# Patient Record
Sex: MALE | Race: BLACK OR AFRICAN AMERICAN | NOT HISPANIC OR LATINO
[De-identification: names, ages, dates, MRNs, and addresses within clinical notes are randomized per-mention and may not be internally consistent; named-entity substitution may affect disease eponyms.]

---

## 2018-04-20 PROBLEM — Z00.00 ENCOUNTER FOR PREVENTIVE HEALTH EXAMINATION: Status: ACTIVE | Noted: 2018-04-20

## 2018-04-23 ENCOUNTER — APPOINTMENT (OUTPATIENT)
Dept: SURGERY | Facility: CLINIC | Age: 58
End: 2018-04-23
Payer: COMMERCIAL

## 2018-04-23 VITALS
WEIGHT: 265 LBS | OXYGEN SATURATION: 94 % | DIASTOLIC BLOOD PRESSURE: 109 MMHG | HEIGHT: 71 IN | SYSTOLIC BLOOD PRESSURE: 165 MMHG | HEART RATE: 97 BPM | TEMPERATURE: 98 F | BODY MASS INDEX: 37.1 KG/M2

## 2018-04-23 DIAGNOSIS — Z78.9 OTHER SPECIFIED HEALTH STATUS: ICD-10-CM

## 2018-04-23 DIAGNOSIS — Z87.09 PERSONAL HISTORY OF OTHER DISEASES OF THE RESPIRATORY SYSTEM: ICD-10-CM

## 2018-04-23 DIAGNOSIS — F17.200 NICOTINE DEPENDENCE, UNSPECIFIED, UNCOMPLICATED: ICD-10-CM

## 2018-04-23 DIAGNOSIS — Z86.79 PERSONAL HISTORY OF OTHER DISEASES OF THE CIRCULATORY SYSTEM: ICD-10-CM

## 2018-04-23 DIAGNOSIS — R22.1 LOCALIZED SWELLING, MASS AND LUMP, NECK: ICD-10-CM

## 2018-04-23 LAB
BASOPHILS # BLD AUTO: 0.04 K/UL
BASOPHILS NFR BLD AUTO: 0.6 %
EOSINOPHIL # BLD AUTO: 0.22 K/UL
EOSINOPHIL NFR BLD AUTO: 3.4 %
HCT VFR BLD CALC: 47.9 %
HGB BLD-MCNC: 15.6 G/DL
IMM GRANULOCYTES NFR BLD AUTO: 0.2 %
LYMPHOCYTES # BLD AUTO: 2.17 K/UL
LYMPHOCYTES NFR BLD AUTO: 34 %
MAN DIFF?: NORMAL
MCHC RBC-ENTMCNC: 28.8 PG
MCHC RBC-ENTMCNC: 32.6 GM/DL
MCV RBC AUTO: 88.5 FL
MONOCYTES # BLD AUTO: 0.52 K/UL
MONOCYTES NFR BLD AUTO: 8.1 %
NEUTROPHILS # BLD AUTO: 3.43 K/UL
NEUTROPHILS NFR BLD AUTO: 53.7 %
PLATELET # BLD AUTO: 249 K/UL
RBC # BLD: 5.41 M/UL
RBC # FLD: 13.4 %
WBC # FLD AUTO: 6.39 K/UL

## 2018-04-23 PROCEDURE — 99202 OFFICE O/P NEW SF 15 MIN: CPT

## 2018-04-24 LAB
ALBUMIN SERPL ELPH-MCNC: 4.3 G/DL
ALP BLD-CCNC: 71 U/L
ALT SERPL-CCNC: 19 U/L
ANION GAP SERPL CALC-SCNC: 14 MMOL/L
AST SERPL-CCNC: 19 U/L
BILIRUB SERPL-MCNC: 0.5 MG/DL
BUN SERPL-MCNC: 15 MG/DL
CALCIUM SERPL-MCNC: 9.3 MG/DL
CHLORIDE SERPL-SCNC: 99 MMOL/L
CO2 SERPL-SCNC: 29 MMOL/L
CREAT SERPL-MCNC: 0.9 MG/DL
GLUCOSE SERPL-MCNC: 91 MG/DL
POTASSIUM SERPL-SCNC: 4.5 MMOL/L
PROT SERPL-MCNC: 7.8 G/DL
SODIUM SERPL-SCNC: 142 MMOL/L

## 2018-05-04 ENCOUNTER — APPOINTMENT (OUTPATIENT)
Dept: SURGERY | Facility: HOSPITAL | Age: 58
End: 2018-05-04

## 2018-07-13 ENCOUNTER — APPOINTMENT (OUTPATIENT)
Dept: SURGERY | Facility: HOSPITAL | Age: 58
End: 2018-07-13

## 2018-08-13 ENCOUNTER — OUTPATIENT (OUTPATIENT)
Dept: OUTPATIENT SERVICES | Facility: HOSPITAL | Age: 58
LOS: 1 days | End: 2018-08-13
Payer: COMMERCIAL

## 2018-08-13 VITALS
OXYGEN SATURATION: 97 % | SYSTOLIC BLOOD PRESSURE: 135 MMHG | TEMPERATURE: 98 F | RESPIRATION RATE: 16 BRPM | DIASTOLIC BLOOD PRESSURE: 80 MMHG | HEART RATE: 92 BPM | HEIGHT: 71 IN | WEIGHT: 294.98 LBS

## 2018-08-13 DIAGNOSIS — G47.33 OBSTRUCTIVE SLEEP APNEA (ADULT) (PEDIATRIC): ICD-10-CM

## 2018-08-13 DIAGNOSIS — R22.1 LOCALIZED SWELLING, MASS AND LUMP, NECK: ICD-10-CM

## 2018-08-13 DIAGNOSIS — Z01.818 ENCOUNTER FOR OTHER PREPROCEDURAL EXAMINATION: ICD-10-CM

## 2018-08-13 DIAGNOSIS — Z98.890 OTHER SPECIFIED POSTPROCEDURAL STATES: Chronic | ICD-10-CM

## 2018-08-13 PROCEDURE — G0463: CPT

## 2018-08-13 RX ORDER — SODIUM CHLORIDE 9 MG/ML
3 INJECTION INTRAMUSCULAR; INTRAVENOUS; SUBCUTANEOUS EVERY 8 HOURS
Qty: 0 | Refills: 0 | Status: DISCONTINUED | OUTPATIENT
Start: 2018-08-15 | End: 2018-08-23

## 2018-08-13 RX ORDER — ALBUTEROL 90 UG/1
2 AEROSOL, METERED ORAL
Qty: 0 | Refills: 0 | COMMUNITY

## 2018-08-13 NOTE — H&P PST ADULT - NEGATIVE ENMT SYMPTOMS
no vertigo/no nasal obstruction/no post-nasal discharge/no abnormal taste sensation/no gum bleeding/no tinnitus/no sinus symptoms/no nose bleeds/no dry mouth/no recurrent cold sores/no throat pain/no dysphagia/no ear pain/no nasal congestion/no nasal discharge/no hearing difficulty

## 2018-08-13 NOTE — H&P PST ADULT - NEGATIVE GENERAL GENITOURINARY SYMPTOMS
no renal colic/no hematuria/no flank pain L/no gas in urine/normal urinary frequency/no dysuria/no incontinence/no urinary hesitancy/no flank pain R/no urine discoloration

## 2018-08-13 NOTE — H&P PST ADULT - NEGATIVE CARDIOVASCULAR SYMPTOMS
no paroxysmal nocturnal dyspnea/no palpitations/no dyspnea on exertion/no orthopnea/no claudication/no peripheral edema/no chest pain

## 2018-08-13 NOTE — H&P PST ADULT - RS GEN PE MLT RESP DETAILS PC
no wheezes/good air movement/airway patent/breath sounds equal/clear to auscultation bilaterally/no rhonchi/no rales/respirations non-labored

## 2018-08-13 NOTE — H&P PST ADULT - PROBLEM SELECTOR PLAN 2
BMI 41.1- HIGH RISK FOR- STOP BANG SCORE 5 for male gender, age, HTN, size of neck and BMI and airway 2 on phonation - possible difficult intubation, hx of COPD, called DR. Baker Surgeon and notified and as per Surgeon sx can be done under local, called DR. Rubalcava Anesthesia , and made aware of all situation

## 2018-08-13 NOTE — H&P PST ADULT - NSANTHOSAYNRD_GEN_A_CORE
No. AZRA screening performed.  STOP BANG Legend: 0-2 = LOW Risk; 3-4 = INTERMEDIATE Risk; 5-8 = HIGH Risk/high risk for AZRA

## 2018-08-13 NOTE — H&P PST ADULT - MALLAMPATI CLASS
airway II on phonation - possible difficult intubation-/Class II - visualization of the soft palate, fauces, and uvula

## 2018-08-13 NOTE — H&P PST ADULT - GASTROINTESTINAL DETAILS
bowel sounds normal/normal/soft/no masses palpable/no rebound tenderness/no guarding/no distention/nontender
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
## 2018-08-13 NOTE — H&P PST ADULT - HISTORY OF PRESENT ILLNESS
58 years old male presented with right side of neck mass x few years, pt had I and D done on ER 5 years ago and mass grew again, no pain noted,. Diagnosed with mass on neck and is scheduled for excision of right neck mass on 8/15/18.

## 2018-08-13 NOTE — H&P PST ADULT - NECK DETAILS
large neck, right side of neck 2 cm round mass, mobile , soft, no tender/supple/no JVD/normal thyroid gland

## 2018-08-14 ENCOUNTER — TRANSCRIPTION ENCOUNTER (OUTPATIENT)
Age: 58
End: 2018-08-14

## 2018-08-15 ENCOUNTER — OUTPATIENT (OUTPATIENT)
Dept: OUTPATIENT SERVICES | Facility: HOSPITAL | Age: 58
LOS: 1 days | End: 2018-08-15
Payer: COMMERCIAL

## 2018-08-15 ENCOUNTER — RESULT REVIEW (OUTPATIENT)
Age: 58
End: 2018-08-15

## 2018-08-15 ENCOUNTER — APPOINTMENT (OUTPATIENT)
Dept: SURGERY | Facility: HOSPITAL | Age: 58
End: 2018-08-15

## 2018-08-15 VITALS
RESPIRATION RATE: 18 BRPM | HEART RATE: 87 BPM | SYSTOLIC BLOOD PRESSURE: 116 MMHG | TEMPERATURE: 98 F | DIASTOLIC BLOOD PRESSURE: 74 MMHG | OXYGEN SATURATION: 95 %

## 2018-08-15 VITALS
RESPIRATION RATE: 17 BRPM | TEMPERATURE: 98 F | HEART RATE: 98 BPM | SYSTOLIC BLOOD PRESSURE: 149 MMHG | OXYGEN SATURATION: 96 % | DIASTOLIC BLOOD PRESSURE: 88 MMHG | WEIGHT: 294.98 LBS | HEIGHT: 71 IN

## 2018-08-15 DIAGNOSIS — R22.1 LOCALIZED SWELLING, MASS AND LUMP, NECK: ICD-10-CM

## 2018-08-15 DIAGNOSIS — Z01.818 ENCOUNTER FOR OTHER PREPROCEDURAL EXAMINATION: ICD-10-CM

## 2018-08-15 DIAGNOSIS — Z98.890 OTHER SPECIFIED POSTPROCEDURAL STATES: Chronic | ICD-10-CM

## 2018-08-15 PROCEDURE — ZZZZZ: CPT

## 2018-08-15 PROCEDURE — 11424 EXC H-F-NK-SP B9+MARG 3.1-4: CPT

## 2018-08-15 PROCEDURE — 88304 TISSUE EXAM BY PATHOLOGIST: CPT

## 2018-08-15 PROCEDURE — 88304 TISSUE EXAM BY PATHOLOGIST: CPT | Mod: 26

## 2018-08-15 PROCEDURE — 12032 INTMD RPR S/A/T/EXT 2.6-7.5: CPT

## 2018-08-15 PROCEDURE — 12042 INTMD RPR N-HF/GENIT2.6-7.5: CPT

## 2018-08-15 RX ORDER — SODIUM CHLORIDE 9 MG/ML
1000 INJECTION, SOLUTION INTRAVENOUS
Qty: 0 | Refills: 0 | Status: DISCONTINUED | OUTPATIENT
Start: 2018-08-15 | End: 2018-08-15

## 2018-08-15 RX ORDER — HYDROMORPHONE HYDROCHLORIDE 2 MG/ML
0.25 INJECTION INTRAMUSCULAR; INTRAVENOUS; SUBCUTANEOUS
Qty: 0 | Refills: 0 | Status: DISCONTINUED | OUTPATIENT
Start: 2018-08-15 | End: 2018-08-15

## 2018-08-15 RX ORDER — IBUPROFEN 200 MG
600 TABLET ORAL EVERY 6 HOURS
Qty: 0 | Refills: 0 | Status: DISCONTINUED | OUTPATIENT
Start: 2018-08-15 | End: 2018-08-23

## 2018-08-15 NOTE — BRIEF OPERATIVE NOTE - PROCEDURE
<<-----Click on this checkbox to enter Procedure Excision of mass of neck  08/15/2018    Active  RESHMA

## 2018-08-20 LAB — SURGICAL PATHOLOGY STUDY: SIGNIFICANT CHANGE UP

## 2021-07-08 NOTE — H&P PST ADULT - NS MD HP INPLANTS MED DEV
None Purse String (Simple) Text: Given the location of the defect and the characteristics of the surrounding skin a purse string simple closure was deemed most appropriate.  Undermining was performed circumferentially around the surgical defect.  A purse string suture was then placed and tightened.

## 2025-02-04 NOTE — H&P PST ADULT - DOES THIS PATIENT HAVE A HISTORY OF OR HAS BEEN DX WITH HEART FAILURE?
Chief Complaint   Patient presents with    Follow-up    LVH    LVOT obstruction       HPI  71 yo WF w/ h/o LVOT ob, LVH, ?AS, cor calc on CT, athero of Ao now here for cardiology FU. Over past few months, having R>L LE edema. No chest pain. No dyspnea at rest. +occ LANE nilo w/ current URI. No palps. No LH/dizziness/syncope. No claudication. No cough outside of current URI.  She exercises at gym 5d/wk (cardio, wgts) with no symptoms.  BP at home: 120s-130s/80s  ECG 9/17: SR (89), LVH w/ ST changes  ECG 11/22: SR (81), LBBB, ?LAE  ECG 2/25: SR (78), PVC, LBBB  Echo 9/17: EF 55-60%, DD, mod-sev conc LVH, LVOT obstruction (peak grad 151), mod LAE, mod AS [looks at most mild to me with JORGE 2.36 by VTI]  Echo 12/22: EF 55-60%, pseudonl/DD, sev LVH, sev LVOT obst (rest 76, Valsalva 130), ?AS  CXR 11/22: no acute abnl, CM  CXR 1/25: reportedly normal  CTA chest 11/22: no PE, sm-mod infiltrate LLL, sm infiltrate RML, sm L pleur eff, cor calc, normal heart size, no peric eff, athero of Ao     Review of Systems   Constitutional: Negative for chills, fever and malaise/fatigue.   HENT:  Negative for hearing loss.    Eyes:  Negative for visual disturbance.   Respiratory:  Negative for cough, hemoptysis and wheezing.    Skin:  Negative for rash.   Musculoskeletal:  Negative for falls and myalgias.   Gastrointestinal:  Negative for bloating, abdominal pain, constipation, diarrhea, dysphagia, nausea and vomiting.   Genitourinary:  Negative for dysuria and hematuria.   Neurological:  Negative for headaches.   Psychiatric/Behavioral:  Negative for altered mental status, depression and memory loss.       Social History     Tobacco Use    Smoking status: Never    Smokeless tobacco: Never   Substance Use Topics    Alcohol use: Never      Family History   Problem Relation Name Age of Onset    Aplastic anemia Mother      Depression Mother      Diabetes Brother        Allergies   Allergen Reactions    Pneumococcal Vaccine Fever     Fever to  "102    Albuterol Palpitations     Increased heart rate and nervousness.    Azithromycin Other     Cardiac arrhthymias    Ciprofloxacin Unknown    Levofloxacin Unknown    Nsaids (Non-Steroidal Anti-Inflammatory Drug) GI Upset and Unknown      Current Outpatient Medications   Medication Instructions    amoxicillin (Amoxil) 500 mg capsule 1 capsule, 3 times daily (0900,1400,1900)    ascorbic acid (Vitamin C) 500 mg tablet 1 tablet, Daily    Bacillus coagulans (PROBIOTIC, B. COAGULANS, ORAL) Take by mouth.    biotin 5,000 mcg tablet,disintegrating Take by mouth.    cholecalciferol (Vitamin D3) 25 MCG (1000 UT) capsule 1 capsule, Daily    collagen, hydrolysate, bovine, (COLLAGEN, HYDR, BOVINE,, BULK, MISC) 1 Scoop, Daily RT    dietary supplement capsule Vision Complex: Lutein and Zeaxanthin. One tab daily by mouth    DOCOSAHEXAENOIC ACID ORAL 2,000 mg, Daily RT    FRUCTOOLIGOSACCHARIDES ORAL 1 capsule, Daily    gelatin, bulk, powder 1 Scoop, Daily RT    glucosam-msm-chond-hyaluron ac 245--1 mg tablet Take by mouth.    glucosamine-chondroitin 500-400 mg tablet 1 tablet, 3 times daily    lactobacillus acidophilus (Lactobacillus acidoph-L.bulgar) tablet tablet Take by mouth.    levomefolate calcium (L-Methylfolate) 15 mg tablet Daily    LUTEIN-ZEAXANTHIN ORAL 1 tablet, Daily    MAGNESIUM CITRATE ORAL Take by mouth.    multivitamin with minerals (Adults Multivitamin) tablet 1 tablet, Daily    omega 3-dha-epa-fish oil (Fish OiL) 1,000 mg (120 mg-180 mg) capsule 1 capsule, Daily    plant stanol shelley (Cholest Off Plus) 450 mg capsule Daily    RESVERATROL ORAL Daily    VITAMIN B COMPLEX ORAL 1 tablet, Daily RT    ZINC ORAL 50 mg, Daily RT      /76 (BP Location: Left arm, Patient Position: Sitting, BP Cuff Size: Adult)   Pulse 77   Ht 1.651 m (5' 5\")   Wt 70.8 kg (156 lb)   SpO2 95%   BMI 25.96 kg/m²       Physical Exam  Constitutional:       Appearance: Normal appearance.   HENT:      Head: Normocephalic and " atraumatic.      Nose: Nose normal.   Neck:      Vascular: No carotid bruit.   Cardiovascular:      Rate and Rhythm: Normal rate and regular rhythm.      Heart sounds: Murmur heard.      Systolic murmur is present with a grade of 3/6.      Comments: Tr-1+ RLE edema, Tr LLE edema  Pulmonary:      Effort: Pulmonary effort is normal.      Breath sounds: Normal breath sounds.   Abdominal:      Palpations: Abdomen is soft.      Tenderness: There is no abdominal tenderness.   Musculoskeletal:      Right lower leg: Edema present.      Left lower leg: Edema present.   Skin:     General: Skin is warm and dry.   Neurological:      General: No focal deficit present.      Mental Status: She is alert.   Psychiatric:         Mood and Affect: Mood normal.         Judgment: Judgment normal.        Results/Data  1/25 Cr 0.88, K 4.1, HGB 11.8, , Ashok 13, DD 0.35 (nl), CRP <0.50, TPN 0.115, pBNP 5700  11/23 Cr 0.8, K 4.1, LFT nl, , HDL 76, TG 76, Chol 237, HGB 12.9, , TSH 2.4  11/22 Cr 0.65, K 3.9, Na 125 -> 132, LFT nl, HGB 8.8, , Ashok 62, TSH 2.2, TPN 0.028  9/17 Cr 0.94, K 4.2, LFT nl, HGB 13.7,      Assessment/Plan   71 yo WF w/ h/o LVOT ob, LVH, ?AS, cor calc on CT, athero of Ao HLD now w/ mild R>L LE edema, ?CHF as evidenced by elevated BNP. Check echo and LE US. Add Lasix 20 qd prn. However, due to LVOT obst, stay hydrated.  She prefers natural remedies over meds. She defers ASA/statin. Recommend red yeast rice.  FU 1 year (earlier if needed)    Ander Bauer MD   no